# Patient Record
Sex: FEMALE | Race: BLACK OR AFRICAN AMERICAN
[De-identification: names, ages, dates, MRNs, and addresses within clinical notes are randomized per-mention and may not be internally consistent; named-entity substitution may affect disease eponyms.]

---

## 2017-09-02 ENCOUNTER — HOSPITAL ENCOUNTER (EMERGENCY)
Dept: HOSPITAL 72 - EMR | Age: 62
Discharge: HOME | End: 2017-09-02
Payer: COMMERCIAL

## 2017-09-02 VITALS — SYSTOLIC BLOOD PRESSURE: 119 MMHG | DIASTOLIC BLOOD PRESSURE: 77 MMHG

## 2017-09-02 VITALS — DIASTOLIC BLOOD PRESSURE: 77 MMHG | SYSTOLIC BLOOD PRESSURE: 119 MMHG

## 2017-09-02 VITALS — BODY MASS INDEX: 31.39 KG/M2 | HEIGHT: 67 IN | WEIGHT: 200 LBS

## 2017-09-02 DIAGNOSIS — H10.9: Primary | ICD-10-CM

## 2017-09-02 DIAGNOSIS — Z88.6: ICD-10-CM

## 2017-09-02 PROCEDURE — 99284 EMERGENCY DEPT VISIT MOD MDM: CPT

## 2017-09-02 NOTE — EMERGENCY ROOM REPORT
History of Present Illness


General


Chief Complaint:  Eye Problems


Source:  Patient





Present Illness


HPI


The patient presents with red eyes since Monday.  She is a  

and was exposed to children that have pink eye.  The crust is yellow color and 

she has a mild cough. Discomfort in eyes = 5/10.





No change in vision.  No sore throat.  No nasal discharge.  No other rashes.  

The eyes are not painful, but feel scratchy.


Allergies:  


Coded Allergies:  


     ACETAMINOPHEN (Verified  Allergy, 12/14/13)


     HYDROCODONE (Verified  Allergy, 12/14/13)





Patient History


Past Medical History:  see triage record


Past Surgical History:  aziza


Social History:  Denies: smoking


Social History Narrative





Last Menstrual Period:  na


Reviewed Nursing Documentation:  PMH: Agreed, PSxH: Agreed





Nursing Documentation-PMH


Past Medical History:  No History, Except For


Hx Cardiac Problems:  Yes - murmur


Hx Gastrointestinal Problems:  Yes - galbladder removed, kidney stone


Hx Cerebrovascular Accident:  Yes - 1996





Review of Systems


All Other Systems:  negative except mentioned in HPI





Physical Exam





Vital Signs








  Date Time  Temp Pulse Resp B/P (MAP) Pulse Ox O2 Delivery O2 Flow Rate FiO2


 


9/2/17 09:47 98.1 77 18 119/77 98 Room Air  








General Appearance:  well appearing, no apparent distress


Head:  normocephalic, atraumatic


Eyes:  bilateral eye PERRL, bilateral eye Scleral Injection, bilateral eye 

other - conjunctivae erythematous


ENT:  hearing grossly normal, normal voice


Neck:  full range of motion, supple


Respiratory:  no respiratory distress, speaking full sentences


Cardiovascular #2:  2+ radial (L)


Gastrointestinal:  overweight


Musculoskeletal:  digits/nails normal, gait/station normal, normal range of 

motion


Neurologic:  alert, normal gait, grossly normal


Psychiatric:  mood/affect normal


Skin:  no rash





Medical Decision Making


Diagnostic Impression:  


 Primary Impression:  


 Conjunctivitis


 Qualified Codes:  H10.33 - Unspecified acute conjunctivitis, bilateral


ER Course


Patient exposed to child with pink eye now with inflammation of eyes.  Ddx: 

viral, bacterial, allergic conjunctivitis.  Antibiotics indicated.  Also will 

give napthcon.





Patient stable for outpatient observation and treatment.





Last Vital Signs








  Date Time  Temp Pulse Resp B/P (MAP) Pulse Ox O2 Delivery O2 Flow Rate FiO2


 


9/2/17 10:38 98.1 77 18 119/77 98 Room Air  








Status:  improved


Disposition:  HOME, SELF-CARE


Condition:  Improved


Scripts


Naphazoline Hcl/Phenir Mal (NAPHCON-A EYE DROPS) 15 Ml Drops


2 DROP OP Q6HR, #10 ML


   Prov: Paco Au M.D.         9/2/17 


Sulfacetamide Sodium (BLEPH-10) 5 Ml Drops


2 DROP OP Q6HR for 7 Days, ML


   Prov: Paco Au M.D.         9/2/17











Paco Au M.D. Sep 2, 2017 10:18

## 2018-05-07 ENCOUNTER — HOSPITAL ENCOUNTER (EMERGENCY)
Dept: HOSPITAL 72 - EMR | Age: 63
Discharge: HOME | End: 2018-05-07
Payer: COMMERCIAL

## 2018-05-07 VITALS — WEIGHT: 230 LBS | BODY MASS INDEX: 36.1 KG/M2 | HEIGHT: 67 IN

## 2018-05-07 VITALS — SYSTOLIC BLOOD PRESSURE: 152 MMHG | DIASTOLIC BLOOD PRESSURE: 91 MMHG

## 2018-05-07 VITALS — SYSTOLIC BLOOD PRESSURE: 132 MMHG | DIASTOLIC BLOOD PRESSURE: 84 MMHG

## 2018-05-07 DIAGNOSIS — G43.909: Primary | ICD-10-CM

## 2018-05-07 DIAGNOSIS — Z86.73: ICD-10-CM

## 2018-05-07 PROCEDURE — 96372 THER/PROPH/DIAG INJ SC/IM: CPT

## 2018-05-07 PROCEDURE — 99284 EMERGENCY DEPT VISIT MOD MDM: CPT

## 2018-05-07 RX ADMIN — DIPHENHYDRAMINE HYDROCHLORIDE ONE MG: 50 INJECTION INTRAMUSCULAR; INTRAVENOUS at 12:23

## 2018-05-07 RX ADMIN — DIPHENHYDRAMINE HYDROCHLORIDE ONE MG: 50 INJECTION INTRAMUSCULAR; INTRAVENOUS at 12:00

## 2018-05-07 NOTE — EMERGENCY ROOM REPORT
History of Present Illness


General


Chief Complaint:  Headache


Source:  Patient





Present Illness


HPI


64 y/o female c/o headache x 1 week.  Associated symptoms include headache in 

the back of her head with mild photophobia and some mild nausea.  Patient 

states that she's had this before and stated that it was because she strains 

her eyes.  She notes that she has some muscle tension in her upper back as 

well.  States that she is not a medication for symptoms.  Patient states that 

she had similar symptoms occurred 2 years ago and at that time she was fully 

evaluated and later discharged.  Patient states that she has a history of his 

TIA in the 1980s but since then has had no issues.  Patient states that she has 

no numbness, tingling, muscle weakness, chest pain, shortness of breath, vision 

changes, facial droop, confusion, sore throat, fever, vomiting, abdominal pain, 

shortness of breath, or rashes.


Allergies:  


Coded Allergies:  


     No Known Allergies (Unverified , 5/7/18)





Patient History


Past Medical History:  see triage record


Past Surgical History:  none


Pertinent Family History:  none


Last Menstrual Period:  na


Reviewed Nursing Documentation:  PMH: Agreed; PSxH: Agreed





Nursing Documentation-PMH


Past Medical History:  No History, Except For


Hx Cardiac Problems:  Yes - murmur


Hx Gastrointestinal Problems:  Yes - galbladder removed, kidney stone


Hx Cerebrovascular Accident:  Yes





Review of Systems


All Other Systems:  negative except mentioned in HPI





Physical Exam





Vital Signs








  Date Time  Temp Pulse Resp B/P (MAP) Pulse Ox O2 Delivery O2 Flow Rate FiO2


 


5/7/18 11:32 98.1 72 18 126/76 97 Room Air  





 98.1       








Sp02 EP Interpretation:  reviewed, normal


General Appearance:  well appearing, no apparent distress, alert, GCS 15, non-

toxic, obese


Head:  normocephalic, atraumatic


Eyes:  bilateral eye normal inspection, bilateral eye PERRL, bilateral eye EOMI

, bilateral eye Fundiscopic - normal


ENT:  hearing grossly normal, normal pharynx, no angioedema, normal voice


Neck:  full range of motion, no bony tend, no carotid bruits, supple/symm/no 

masses


Respiratory:  chest non-tender, lungs clear, normal breath sounds, speaking 

full sentences


Cardiovascular #1:  regular rate, rhythm, no edema


Musculoskeletal:  back normal, gait/station normal, normal range of motion, non-

tender


Neurologic:  alert, oriented x3, responsive, CNs III-XII nml as tested, motor 

strength/tone normal, DTRs symmetric, sensory intact, speech normal, other - 

pronator drift and romberg negative.


Psychiatric:  judgement/insight normal, memory normal, mood/affect normal, no 

suicidal/homicidal ideation


Skin:  normal color, no rash, warm/dry, well hydrated


Lymphatic:  no adenopathy





Medical Decision Making


PA Attestation


Dr. Marquez my supervising physician with whom patient management has been 

discussed with.


Diagnostic Impression:  


 Primary Impression:  


 Migraine headache


ER Course


Pt. presents to the ED c/o headache


Ddx considered but are not limited to CVA, drug abuse, meningitis, migraine, 

tension HA, cluster HA, trigeminal neuralgia, transverse myelitis, rhabdo, 

acute renal failure, hypertension emergency, intracranial hemorrhage, concussion

, skull fracture, spinal fracture


Vital signs: are WNL, pt. is afebrile 


H&PE: Migraine HA. Patient's symptoms improved while in the ED w/o any 

interventions. Patient has a normal neurological exam and her symptoms are 

chronic and reocurring. 


ORDERS: None required at this time.


ED INTERVENTIONS:  Toradol and benadryl was ordered but the patient stated she 

felt better prior to medications being given and stated she wanted to go home.


DISCHARGE: At this time pt. is stable for d/c to home. Will provide printed 

patient care instructions, and any necessary prescriptions. Care plan and 

follow up instructions have been discussed with the patient prior to discharge.





Last Vital Signs








  Date Time  Temp Pulse Resp B/P (MAP) Pulse Ox O2 Delivery O2 Flow Rate FiO2


 


5/7/18 12:27 98.1       


 


5/7/18 11:32  72 18 126/76 97 Room Air  








Disposition:  HOME, SELF-CARE


Condition:  Stable


Scripts


Ondansetron Odt* (ZOFRAN ODT*) 8 Mg Tab.rapdis


8 MG ORAL Q8HR PRN for Nausea & Vomiting, #12 TAB


   Prov: SABRY,TAMEEM P.A.         5/7/18 


Naproxen* (NAPROXEN*) 500 Mg Tablet.dr


500 MG ORAL TWICE A DAY for 10 Days, #20 TAB


   Prov: SABRY,TAMEEM P.A.         5/7/18


Referrals:  


[facility],REFERRING (PCP)


Patient Instructions:  Migraine Headache





Additional Instructions:  


Take medication as directed. Follow up with your PCP within the next 2-3 days. 

Advised patient to go to the ER immediately if you experience a headache that 

is sudden and becomes severe within a few seconds or minutes, or that could be 

described as "the worst headache of your life", or if headache is severe and 

occurs with a fever or stiff neck, occurs with a seizure, personality changes, 

confusion, or passing out, begins quickly after strenuous exercise or minor 

injury, or if headache is new and occurs with weakness, numbness, or difficulty 

seeing. While migraine headaches can sometimes cause these symptoms, you should 

be evaluated urgently the first time these symptoms appear. Return sooner if 

sxs worsen or do not improve.











DIVYA BARNHART May 7, 2018 12:35